# Patient Record
Sex: MALE | Race: WHITE | NOT HISPANIC OR LATINO | URBAN - METROPOLITAN AREA
[De-identification: names, ages, dates, MRNs, and addresses within clinical notes are randomized per-mention and may not be internally consistent; named-entity substitution may affect disease eponyms.]

---

## 2024-01-26 ENCOUNTER — OUTPATIENT (OUTPATIENT)
Dept: INPATIENT UNIT | Facility: HOSPITAL | Age: 59
LOS: 1 days | Discharge: HOME | End: 2024-01-26
Payer: COMMERCIAL

## 2024-01-26 VITALS
OXYGEN SATURATION: 97 % | HEIGHT: 72 IN | HEART RATE: 64 BPM | DIASTOLIC BLOOD PRESSURE: 88 MMHG | WEIGHT: 220.02 LBS | RESPIRATION RATE: 15 BRPM | TEMPERATURE: 98 F | SYSTOLIC BLOOD PRESSURE: 136 MMHG

## 2024-01-26 VITALS
SYSTOLIC BLOOD PRESSURE: 119 MMHG | HEART RATE: 77 BPM | RESPIRATION RATE: 15 BRPM | DIASTOLIC BLOOD PRESSURE: 69 MMHG | OXYGEN SATURATION: 96 % | TEMPERATURE: 99 F

## 2024-01-26 DIAGNOSIS — D22.39 MELANOCYTIC NEVI OF OTHER PARTS OF FACE: ICD-10-CM

## 2024-01-26 DIAGNOSIS — D49.2 NEOPLASM OF UNSPECIFIED BEHAVIOR OF BONE, SOFT TISSUE, AND SKIN: ICD-10-CM

## 2024-01-26 DIAGNOSIS — L82.0 INFLAMED SEBORRHEIC KERATOSIS: ICD-10-CM

## 2024-01-26 DIAGNOSIS — S21.201A UNSPECIFIED OPEN WOUND OF RIGHT BACK WALL OF THORAX WITHOUT PENETRATION INTO THORACIC CAVITY, INITIAL ENCOUNTER: ICD-10-CM

## 2024-01-26 DIAGNOSIS — L57.8 OTHER SKIN CHANGES DUE TO CHRONIC EXPOSURE TO NONIONIZING RADIATION: ICD-10-CM

## 2024-01-26 DIAGNOSIS — D23.4 OTHER BENIGN NEOPLASM OF SKIN OF SCALP AND NECK: ICD-10-CM

## 2024-01-26 DIAGNOSIS — I10 ESSENTIAL (PRIMARY) HYPERTENSION: ICD-10-CM

## 2024-01-26 DIAGNOSIS — D17.1 BENIGN LIPOMATOUS NEOPLASM OF SKIN AND SUBCUTANEOUS TISSUE OF TRUNK: ICD-10-CM

## 2024-01-26 DIAGNOSIS — Z98.890 OTHER SPECIFIED POSTPROCEDURAL STATES: Chronic | ICD-10-CM

## 2024-01-26 DIAGNOSIS — D23.39 OTHER BENIGN NEOPLASM OF SKIN OF OTHER PARTS OF FACE: ICD-10-CM

## 2024-01-26 DIAGNOSIS — Z87.19 PERSONAL HISTORY OF OTHER DISEASES OF THE DIGESTIVE SYSTEM: Chronic | ICD-10-CM

## 2024-01-26 DIAGNOSIS — G47.30 SLEEP APNEA, UNSPECIFIED: ICD-10-CM

## 2024-01-26 DIAGNOSIS — D23.22 OTHER BENIGN NEOPLASM OF SKIN OF LEFT EAR AND EXTERNAL AURICULAR CANAL: ICD-10-CM

## 2024-01-26 DIAGNOSIS — K21.9 GASTRO-ESOPHAGEAL REFLUX DISEASE WITHOUT ESOPHAGITIS: ICD-10-CM

## 2024-01-26 LAB
POTASSIUM SERPL-MCNC: 3.8 MMOL/L — SIGNIFICANT CHANGE UP (ref 3.5–5.3)
POTASSIUM SERPL-SCNC: 3.8 MMOL/L — SIGNIFICANT CHANGE UP (ref 3.5–5.3)

## 2024-01-26 PROCEDURE — C9399: CPT

## 2024-01-26 PROCEDURE — 88305 TISSUE EXAM BY PATHOLOGIST: CPT

## 2024-01-26 PROCEDURE — 36415 COLL VENOUS BLD VENIPUNCTURE: CPT

## 2024-01-26 PROCEDURE — 88304 TISSUE EXAM BY PATHOLOGIST: CPT

## 2024-01-26 PROCEDURE — C9290: CPT

## 2024-01-26 PROCEDURE — 88305 TISSUE EXAM BY PATHOLOGIST: CPT | Mod: 26

## 2024-01-26 PROCEDURE — 88304 TISSUE EXAM BY PATHOLOGIST: CPT | Mod: 26

## 2024-01-26 PROCEDURE — 84132 ASSAY OF SERUM POTASSIUM: CPT

## 2024-01-26 RX ORDER — FLUOXETINE HCL 10 MG
0 CAPSULE ORAL
Refills: 0 | DISCHARGE

## 2024-01-26 RX ORDER — ROSUVASTATIN CALCIUM 5 MG/1
0 TABLET ORAL
Refills: 0 | DISCHARGE

## 2024-01-26 RX ORDER — SODIUM CHLORIDE 9 MG/ML
1000 INJECTION, SOLUTION INTRAVENOUS
Refills: 0 | Status: DISCONTINUED | OUTPATIENT
Start: 2024-01-26 | End: 2024-01-26

## 2024-01-26 RX ORDER — ONDANSETRON 8 MG/1
4 TABLET, FILM COATED ORAL ONCE
Refills: 0 | Status: DISCONTINUED | OUTPATIENT
Start: 2024-01-26 | End: 2024-01-26

## 2024-01-26 RX ORDER — HYDROMORPHONE HYDROCHLORIDE 2 MG/ML
1 INJECTION INTRAMUSCULAR; INTRAVENOUS; SUBCUTANEOUS
Refills: 0 | Status: DISCONTINUED | OUTPATIENT
Start: 2024-01-26 | End: 2024-01-26

## 2024-01-26 RX ORDER — OMEPRAZOLE 10 MG/1
0 CAPSULE, DELAYED RELEASE ORAL
Refills: 0 | DISCHARGE

## 2024-01-26 RX ORDER — OXYCODONE HYDROCHLORIDE 5 MG/1
5 TABLET ORAL ONCE
Refills: 0 | Status: DISCONTINUED | OUTPATIENT
Start: 2024-01-26 | End: 2024-01-26

## 2024-01-26 RX ORDER — AMLODIPINE BESYLATE 2.5 MG/1
0 TABLET ORAL
Refills: 0 | DISCHARGE

## 2024-01-26 RX ADMIN — OXYCODONE HYDROCHLORIDE 5 MILLIGRAM(S): 5 TABLET ORAL at 11:12

## 2024-01-26 RX ADMIN — OXYCODONE HYDROCHLORIDE 5 MILLIGRAM(S): 5 TABLET ORAL at 14:28

## 2024-01-26 RX ADMIN — OXYCODONE HYDROCHLORIDE 5 MILLIGRAM(S): 5 TABLET ORAL at 14:48

## 2024-01-26 NOTE — ASU DISCHARGE PLAN (ADULT/PEDIATRIC) - BATHING
Follow up on Hashimoto thyroiditis.     Dx with hypothyroidism at age 21.   On levothyroxine 112mcq. Pt reports good compliance with medication, no missed doses.   TSH from 7/25/2017 at 9.226 tested in ED when pt was seen for chest pain. Dose of the medication was not adjusted by the ED.  TPO Ab Oct 18, 2015 at 18,448.     Thyroid US done today showing thyroid gland to be very heterogenous and images are consistent with an autoimmune thyroiditis.   Here are small thyroid nodules, some of the cystic.   The largest one measuring 1.58 x 1.26 x 0.46cm in the isthmus.     H/o FNA 1/29/2015.   Cytology isthmus nodule: non diagnostic. Fairly abundant colloid, occasional histiocytes, rare follicular cells, occasional scattered background lymphocytes.   Cytology of R thyroid nodule:  Evaluation limited due to lack of follicular cells. Lymphocytes, lymphoid tangles, occasional plasma cells and histiocytes suggestive of a reactive LN.   FNA repeated Oct 5, 2015:   Thyroid, right mid, fine needle aspirate: Reactive lymphoid tissue.   Thyroid, right Isthmus: Reactive lymphoid tissue, associated cyst contents.   Both consistent with Hashimoto thyroiditis.       No family h/o thyroid disease or thyroid cancer.  No h/o childhood neck radiation    Lab Review  TSH (mcUnits/mL)   Date Value   07/25/2017 9.226 (H)     T4, FREE (ng/dL)   Date Value   12/01/2016 1.1       Imaging: thyroid US done today and reviewed.   Findings:   Thyroid Measurements: (long x transverse x AP Isthmus). Right Lobe: 6.31 x 2.21 x 1.95cm. Left Lobe: 5.35 x 1.74 x 1.59cm  Isthmus: 0.78cm  Both lobes are very heterogenous and typical of Hashimoto thyroid disease.      Thyroid nodules:   Right lobe:   1. R lobe mid, cystic nodule measuring 1.33 x 0.74 x 0.52cm, well defined, no micro calcifications, vascularity in the periphery. Homogenous, hypoechoic.   Isthmus:   Solid nodule measuring 1.58 x 1.26 x 0.46cm, well defined, heterogenous, mostly iso echoic, no  micro calcifications, peripheral vascularity.   Left lobe:   1. L lobe inf hypoechoic area, has an appearance of pseudo nodule, measuring 0.72 x 0.51 x 0.42cm, heterogenous, hypoechoic, peripheral vascularity, well defined. No micro calcifications.      Lymph Nodes: LN in LV 6 b/l have nml appearance.   Few of them mentioned below:   Right:   1. LV 6 inf 0.80 x 0.52 x 0.41cm  Left:   1. LV 6 inf 1.40 x 0.69 x 0.82cm  Mid:   LV 6 inf 1.35 x 1.33 x 0.38cm         HISTORY  Patient histories have been reviewed in the electronic medical record.   allergies and medications reviewed.       REVIEW OF SYSTEMS  Eye: no visual blurring.  ENT:No headaches, no lightheadedness.   Reports neck tenderness with touch.   Cardiovascular: No palpitations, no chest pain at the current time, had recent stress test-no results yet. No LE edema.   Respiratory: No shortness of breath.   Gastro-intestinal: Appetite good. No diarrhea or constipation. H/o cholecystectomy.   Genito-urinary: No nocturia, dysuria, no urgency with urination. H/o nephrolithiasis.    Musculoskeletal:No back pain, arthritis, or muscular weakness.   Integumentary:No rashes or ulcerations, no itching, bruising.  Neurological: Occasional numbness, tingling in upper extremities.  Psychiatric: has anxiety, depression, sleep disturbances.   Endocrine: hypothyroidism, on levothyroxine. MNG.  Hematologic/Lymphatic: No anemia. Reports gaining 10lbs since last office visit.           PHYSICAL EXAM  Visit Vitals  /70   Pulse 91   Ht 5' 1\" (1.549 m)   Wt 69.1 kg   LMP 08/01/2017   BMI 28.78 kg/m²   Constitutional:  resting, normal appearance, well nourished, here with her .   Psychiatric:  alert and oriented x3, normal mood and affect  Eyes:  clear conjunctiva, no icterus or erythema.   Neck:  Supple, no visible masses, trachea midline. Thyroid isthmus is prominent, thyroid is tender to touch.   Resp:  nonlabored and no accessory muscle use.   CV:  No LE edema.   GI:   nontender, nondistended, soft.   Skin:  warm and dry and normal texture and temperature, no rashes or ulcerations noted  MSK:  normal gait, no clubbing or cyanosis and 5/5 strength in all extremities.   Neurologic:  EOMI, CN 2-12 appear grossly intact, no tremors.     ASSESSMENT  Hashimoto thyroiditis    PLAN  Most current TSH from 7/25/2017 at 9.226.   She is currently on levothyroxine 112mcq that she reports good compliance with taking.   Usually waits 2hrs before eating.   Increase the dose of levothyroxine to 137mcq daily.   Recheck TSH in 6-8 weeks.   Hold biotin for 24hrs before having labs done as biotin interferes with thyroid assays.   Goal TSH <2.5.     Last thyroid US done today and images are consistent with chronic lymphocytic thyroid disease.   Nodular/pseudo nodular areas are stable in size as comparison to the [place] Oct 2015.   Clinical follow up recommended.     We will call with lab results and further levothyroxine adjustment.   Yearly follow up.   I have spent 25 minutes with the patient today in which greater than 50% of time was spent counseling and coordinating care on the above.       Shower only

## 2024-01-26 NOTE — BRIEF OPERATIVE NOTE - NSICDXBRIEFPROCEDURE_GEN_ALL_CORE_FT
PROCEDURES:  Excision, lesion, benign, face, 0.6 cm to 1.0 cm in diameter 26-Jan-2024 10:39:41  Jak Pickering

## 2024-01-26 NOTE — ASU PATIENT PROFILE, ADULT - NSICDXPASTSURGICALHX_GEN_ALL_CORE_FT
Stable
PAST SURGICAL HISTORY:  H/O appendicitis     H/O hernia repair     History of back surgery "DiscSx"    S/P eye surgery [facility] R eye

## 2024-01-26 NOTE — ASU DISCHARGE PLAN (ADULT/PEDIATRIC) - CARE PROVIDER_API CALL
Juan Powell  Plastic Surgery  [address], [address]  [place] [address]  Phone: [phone]  Fax: [phone]  Follow Up Time: 1 week

## 2024-01-26 NOTE — ASU PATIENT PROFILE, ADULT - NSICDXPASTMEDICALHX_GEN_ALL_CORE_FT
PAST MEDICAL HISTORY:  H/O gastroesophageal reflux (GERD)     HTN (hypertension)     Sleep apnea, obstructive New dx, [id number]

## 2024-01-26 NOTE — ASU DISCHARGE PLAN (ADULT/PEDIATRIC) - NS MD DC FALL RISK RISK
For information on Fall & Injury Prevention, visit: [email or web address] OR  [email or web address] OR  https://www.cdc.gov/steadi/patient.html

## 2024-02-01 LAB — SURGICAL PATHOLOGY STUDY: SIGNIFICANT CHANGE UP
